# Patient Record
Sex: FEMALE | Race: WHITE
[De-identification: names, ages, dates, MRNs, and addresses within clinical notes are randomized per-mention and may not be internally consistent; named-entity substitution may affect disease eponyms.]

---
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
## 2022-08-18 ENCOUNTER — OFFICE VISIT (OUTPATIENT)
Dept: OBGYN | Facility: CLINIC | Age: 22
End: 2022-08-18
Attending: NURSE PRACTITIONER
Payer: COMMERCIAL

## 2022-08-18 VITALS
DIASTOLIC BLOOD PRESSURE: 85 MMHG | HEIGHT: 66 IN | BODY MASS INDEX: 26.52 KG/M2 | SYSTOLIC BLOOD PRESSURE: 129 MMHG | HEART RATE: 93 BPM | WEIGHT: 165 LBS

## 2022-08-18 DIAGNOSIS — Z01.411 ENCOUNTER FOR GYNECOLOGICAL EXAMINATION WITH ABNORMAL FINDING: Primary | ICD-10-CM

## 2022-08-18 DIAGNOSIS — Z11.3 SCREEN FOR STD (SEXUALLY TRANSMITTED DISEASE): ICD-10-CM

## 2022-08-18 DIAGNOSIS — Z12.4 SCREENING FOR CERVICAL CANCER: ICD-10-CM

## 2022-08-18 DIAGNOSIS — N63.0 LUMP OR MASS IN BREAST: ICD-10-CM

## 2022-08-18 PROCEDURE — 87491 CHLMYD TRACH DNA AMP PROBE: CPT | Performed by: NURSE PRACTITIONER

## 2022-08-18 PROCEDURE — G0145 SCR C/V CYTO,THINLAYER,RESCR: HCPCS | Performed by: NURSE PRACTITIONER

## 2022-08-18 PROCEDURE — G0101 CA SCREEN;PELVIC/BREAST EXAM: HCPCS | Performed by: NURSE PRACTITIONER

## 2022-08-18 PROCEDURE — 87591 N.GONORRHOEAE DNA AMP PROB: CPT | Performed by: NURSE PRACTITIONER

## 2022-08-18 PROCEDURE — G0463 HOSPITAL OUTPT CLINIC VISIT: HCPCS

## 2022-08-18 ASSESSMENT — ANXIETY QUESTIONNAIRES
GAD7 TOTAL SCORE: 2
5. BEING SO RESTLESS THAT IT IS HARD TO SIT STILL: NOT AT ALL
7. FEELING AFRAID AS IF SOMETHING AWFUL MIGHT HAPPEN: NOT AT ALL
GAD7 TOTAL SCORE: 2
8. IF YOU CHECKED OFF ANY PROBLEMS, HOW DIFFICULT HAVE THESE MADE IT FOR YOU TO DO YOUR WORK, TAKE CARE OF THINGS AT HOME, OR GET ALONG WITH OTHER PEOPLE?: NOT DIFFICULT AT ALL
7. FEELING AFRAID AS IF SOMETHING AWFUL MIGHT HAPPEN: NOT AT ALL
4. TROUBLE RELAXING: SEVERAL DAYS
6. BECOMING EASILY ANNOYED OR IRRITABLE: NOT AT ALL
2. NOT BEING ABLE TO STOP OR CONTROL WORRYING: NOT AT ALL
3. WORRYING TOO MUCH ABOUT DIFFERENT THINGS: NOT AT ALL
1. FEELING NERVOUS, ANXIOUS, OR ON EDGE: SEVERAL DAYS
IF YOU CHECKED OFF ANY PROBLEMS ON THIS QUESTIONNAIRE, HOW DIFFICULT HAVE THESE PROBLEMS MADE IT FOR YOU TO DO YOUR WORK, TAKE CARE OF THINGS AT HOME, OR GET ALONG WITH OTHER PEOPLE: NOT DIFFICULT AT ALL

## 2022-08-18 ASSESSMENT — PAIN SCALES - GENERAL: PAINLEVEL: NO PAIN (0)

## 2022-08-18 NOTE — LETTER
2022       RE: Salma Aguero  [address] [facility] [address]     Dear Colleague,    Thank you for referring your patient, Salma Aguero, to the [facility] [place] at [facility]. Please see a copy of my visit note below.    Subjective:  Salma Aguero is a 22 yr old female, , who presents to clinic today for a breast and pelvic exam.    1. Pap smear: First one due today.    2. Breast lump: noticed 1 month ago - Right breast; one of her friend's mothers was recently diagnosed with breast cancer making her more concerned about this finding; has not changed over the month.  Reports generalized bilateral tenderness when she first noticed it, now denies pain in the area or any discomfort.  Paternal Aunt with breast cancer; no fam members with colon or ovarian cancer.  + nipple discharge bilaterally - white in color; scant; noticed only with showering, after stimulation.     Menstrual hx:   Monthly periods, heavy with significant cramping.  On heaviest days, changes pad/ tampon 3-4 times.  Uses Midol or heating pads to help cramping.    Sexual hx: not currently sexually active; last was 1 yr ago.    - never had STD testing done, open to this today.   - denies sexual concerns  - not currently on birth control     Social hx: From [place], moved here for college at [employer]; studying neuroscience and public health.    - used to vape, no longer    Answers for HPI/ROS submitted by the patient on 2022  GIOVANNI 7 TOTAL SCORE: 2    Past Medical History:   Diagnosis Date     Uncomplicated asthma      Past Surgical History:   Procedure Laterality Date     wisdom tooth removal       Family History   Problem Relation Age of Onset     Diabetes Maternal Grandfather         Parvin     No current outpatient medications on file.     No current facility-administered medications for this visit.        Allergies   Allergen Reactions  "    Seasonal Allergies        Objective:  /85   Pulse 93   Ht 1.676 m (5' 6\")   Wt 74.8 kg (165 lb)   BMI 26.63 kg/m    General: pleasant female in no acute distress  Psych: normal mentation, well oriented  Respiratory:  Unlabored breathing  Musculoskeletal: no gross deformities  Breast:   right breast lump - pea sized, firm, 2cm from the nipple, just above 9 o'clock position   Left breast without lesion  No evidence of nipple discharge bilaterally; no skin color changes; no lymphadenopathy  Pelvic Exam:  Vulva: No external lesions, normal hair distribution, no adenopathy  Vagina: Moist, pink, no abnormal discharge, well rugated, no lesions  Cervix: Pap smear is taken, nulliparous, smooth, pink, no visible lesions  Rectal exam: Normal anus    Assessment:  Encounter Diagnoses   Name Primary?     Screening for cervical cancer      Lump or mass in breast      Screen for STD (sexually transmitted disease)      Encounter for gynecological examination with abnormal finding Yes     Plan:  STD testing completed today  Pap smear done today  Breast US and mammogram ordered per protocol for evaluation of breast lump. Pt to call to schedule this appointment.    Pt expressed understanding and agreement with the plan for care.    Kelly Maria, ALFONSO, APRN, WHNP            Again, thank you for allowing me to participate in the care of your patient.      Sincerely,    FRANKLYN Muir CNP      "

## 2022-08-18 NOTE — LETTER
Date:August 27, 2022      Provider requested that no letter be sent. Do not send.       [facility]

## 2022-08-18 NOTE — PROGRESS NOTES
"Subjective:  Salma Aguero is a 22 yr old female, , who presents to clinic today for a breast and pelvic exam.    1. Pap smear: First one due today.    2. Breast lump: noticed 1 month ago - Right breast; one of her friend's mothers was recently diagnosed with breast cancer making her more concerned about this finding; has not changed over the month.  Reports generalized bilateral tenderness when she first noticed it, now denies pain in the area or any discomfort.  Paternal Aunt with breast cancer; no fam members with colon or ovarian cancer.  + nipple discharge bilaterally - white in color; scant; noticed only with showering, after stimulation.     Menstrual hx:   Monthly periods, heavy with significant cramping.  On heaviest days, changes pad/ tampon 3-4 times.  Uses Midol or heating pads to help cramping.    Sexual hx: not currently sexually active; last was 1 yr ago.    - never had STD testing done, open to this today.   - denies sexual concerns  - not currently on birth control     Social hx: From [place], moved here for college at [employer]; studying neuroscience and public health.    - used to vape, no longer    Answers for HPI/ROS submitted by the patient on 2022  GIOVANNI 7 TOTAL SCORE: 2    Past Medical History:   Diagnosis Date     Uncomplicated asthma      Past Surgical History:   Procedure Laterality Date     wisdom tooth removal       Family History   Problem Relation Age of Onset     Diabetes Maternal Grandfather         Parvin     No current outpatient medications on file.     No current facility-administered medications for this visit.        Allergies   Allergen Reactions     Seasonal Allergies        Objective:  /85   Pulse 93   Ht 1.676 m (5' 6\")   Wt 74.8 kg (165 lb)   BMI 26.63 kg/m    General: pleasant female in no acute distress  Psych: normal mentation, well oriented  Respiratory:  Unlabored breathing  Musculoskeletal: no gross deformities  Breast:   right breast " lump - pea sized, firm, 2cm from the nipple, just above 9 o'clock position   Left breast without lesion  No evidence of nipple discharge bilaterally; no skin color changes; no lymphadenopathy  Pelvic Exam:  Vulva: No external lesions, normal hair distribution, no adenopathy  Vagina: Moist, pink, no abnormal discharge, well rugated, no lesions  Cervix: Pap smear is taken, nulliparous, smooth, pink, no visible lesions  Rectal exam: Normal anus    Assessment:  Encounter Diagnoses   Name Primary?     Screening for cervical cancer      Lump or mass in breast      Screen for STD (sexually transmitted disease)      Encounter for gynecological examination with abnormal finding Yes     Plan:  STD testing completed today  Pap smear done today  Breast US and mammogram ordered per protocol for evaluation of breast lump. Pt to call to schedule this appointment.    Pt expressed understanding and agreement with the plan for care.    Kelly Maria, DNP, APRN, WHNP

## 2022-08-19 ENCOUNTER — ANCILLARY PROCEDURE (OUTPATIENT)
Dept: MAMMOGRAPHY | Facility: CLINIC | Age: 22
End: 2022-08-19
Attending: NURSE PRACTITIONER
Payer: COMMERCIAL

## 2022-08-19 DIAGNOSIS — N63.0 LUMP OR MASS IN BREAST: ICD-10-CM

## 2022-08-19 LAB
C TRACH DNA SPEC QL NAA+PROBE: NEGATIVE
N GONORRHOEA DNA SPEC QL NAA+PROBE: NEGATIVE

## 2022-08-19 PROCEDURE — 76642 ULTRASOUND BREAST LIMITED: CPT | Mod: RT | Performed by: RADIOLOGY

## 2022-08-23 LAB
BKR LAB AP GYN ADEQUACY: NORMAL
BKR LAB AP GYN INTERPRETATION: NORMAL
BKR LAB AP HPV REFLEX: NO
BKR LAB AP PREVIOUS ABNORMAL: NORMAL
PATH REPORT.COMMENTS IMP SPEC: NORMAL
PATH REPORT.COMMENTS IMP SPEC: NORMAL
PATH REPORT.RELEVANT HX SPEC: NORMAL

## 2022-10-03 ENCOUNTER — HEALTH MAINTENANCE LETTER (OUTPATIENT)
Age: 22
End: 2022-10-03

## 2023-01-10 ENCOUNTER — MYC MEDICAL ADVICE (OUTPATIENT)
Dept: OBGYN | Facility: CLINIC | Age: 23
End: 2023-01-10

## 2023-01-10 DIAGNOSIS — N63.12 MASS OF UPPER INNER QUADRANT OF RIGHT BREAST: Primary | ICD-10-CM

## 2023-01-10 DIAGNOSIS — N63.0 LUMP OR MASS IN BREAST: ICD-10-CM

## 2023-01-10 NOTE — TELEPHONE ENCOUNTER
Follow up right breast ultrasound ordered per reading radiologist's recommendation. Pended to Kelly for review.

## 2023-02-10 ENCOUNTER — ANCILLARY PROCEDURE (OUTPATIENT)
Dept: MAMMOGRAPHY | Facility: CLINIC | Age: 23
End: 2023-02-10
Attending: NURSE PRACTITIONER
Payer: COMMERCIAL

## 2023-02-10 DIAGNOSIS — N63.12 MASS OF UPPER INNER QUADRANT OF RIGHT BREAST: ICD-10-CM

## 2023-02-10 PROBLEM — N63.10 LUMP OF RIGHT BREAST: Status: ACTIVE | Noted: 2023-02-10

## 2023-02-10 PROCEDURE — 76642 ULTRASOUND BREAST LIMITED: CPT | Mod: RT | Performed by: RADIOLOGY

## 2023-10-22 ENCOUNTER — HEALTH MAINTENANCE LETTER (OUTPATIENT)
Age: 23
End: 2023-10-22

## 2024-12-15 ENCOUNTER — HEALTH MAINTENANCE LETTER (OUTPATIENT)
Age: 24
End: 2024-12-15